# Patient Record
Sex: MALE | Race: WHITE | URBAN - METROPOLITAN AREA
[De-identification: names, ages, dates, MRNs, and addresses within clinical notes are randomized per-mention and may not be internally consistent; named-entity substitution may affect disease eponyms.]

---

## 2023-12-16 ENCOUNTER — DOCUMENTATION (OUTPATIENT)
Dept: BARIATRICS/WEIGHT MGMT | Facility: CLINIC | Age: 48
End: 2023-12-16

## 2023-12-16 PROBLEM — I10 ESSENTIAL HYPERTENSION: Status: ACTIVE | Noted: 2023-12-16

## 2023-12-16 RX ORDER — LOSARTAN POTASSIUM 50 MG/1
50 TABLET ORAL DAILY
COMMUNITY

## 2023-12-16 RX ORDER — AZITHROMYCIN 250 MG/1
TABLET, FILM COATED ORAL
Qty: 6 TABLET | Refills: 0 | Status: CANCELLED | OUTPATIENT
Start: 2023-12-16

## 2023-12-16 RX ORDER — AZITHROMYCIN 250 MG/1
TABLET, FILM COATED ORAL
Qty: 6 TABLET | Refills: 0 | Status: SHIPPED | OUTPATIENT
Start: 2023-12-16

## 2024-06-12 ENCOUNTER — DOCUMENTATION (OUTPATIENT)
Dept: BARIATRICS/WEIGHT MGMT | Facility: CLINIC | Age: 49
End: 2024-06-12

## 2024-06-12 RX ORDER — VALACYCLOVIR HYDROCHLORIDE 1 G/1
2000 TABLET, FILM COATED ORAL 2 TIMES DAILY PRN
Qty: 30 TABLET | Refills: 0 | Status: SHIPPED | OUTPATIENT
Start: 2024-06-12

## 2024-06-12 RX ORDER — ACYCLOVIR 50 MG/G
1 OINTMENT TOPICAL EVERY 4 HOURS PRN
Qty: 30 G | Refills: 1 | Status: SHIPPED | OUTPATIENT
Start: 2024-06-12

## 2025-03-06 ENCOUNTER — OFFICE VISIT (OUTPATIENT)
Dept: BARIATRICS/WEIGHT MGMT | Facility: CLINIC | Age: 50
End: 2025-03-06
Payer: COMMERCIAL

## 2025-03-06 VITALS
WEIGHT: 191 LBS | BODY MASS INDEX: 30.7 KG/M2 | TEMPERATURE: 97.8 F | DIASTOLIC BLOOD PRESSURE: 90 MMHG | HEART RATE: 80 BPM | HEIGHT: 66 IN | SYSTOLIC BLOOD PRESSURE: 138 MMHG

## 2025-03-06 DIAGNOSIS — I10 ESSENTIAL HYPERTENSION: ICD-10-CM

## 2025-03-06 DIAGNOSIS — E66.811 OBESITY, CLASS I, BMI 30-34.9: ICD-10-CM

## 2025-03-06 DIAGNOSIS — K21.9 GASTROESOPHAGEAL REFLUX DISEASE, UNSPECIFIED WHETHER ESOPHAGITIS PRESENT: Primary | ICD-10-CM

## 2025-03-06 DIAGNOSIS — R05.3 CHRONIC COUGH: ICD-10-CM

## 2025-03-06 PROCEDURE — 99202 OFFICE O/P NEW SF 15 MIN: CPT | Performed by: SURGERY

## 2025-03-06 RX ORDER — FAMOTIDINE 20 MG/1
20 TABLET, FILM COATED ORAL 2 TIMES DAILY
COMMUNITY

## 2025-03-06 RX ORDER — PANTOPRAZOLE SODIUM 40 MG/1
40 TABLET, DELAYED RELEASE ORAL DAILY
COMMUNITY

## 2025-03-06 RX ORDER — AMLODIPINE BESYLATE 5 MG/1
5 TABLET ORAL DAILY
COMMUNITY

## 2025-03-06 RX ORDER — LORATADINE 10 MG/1
10 TABLET ORAL DAILY
COMMUNITY

## 2025-03-06 NOTE — H&P (VIEW-ONLY)
Chief complaint: Chronic cough with history of hiatal hernia      Subjective     History of Present Illness:    Sam Roberson is a 49 y.o. male.  With complaints of chronic cough for the past several years.  Patient states that he underwent upper endoscopy about 15 years ago and was noted to have a hiatal hernia.  Patient was started on Protonix and was on that for many years which controlled his symptoms fairly well.  He states that his symptoms would improve with weight loss and worsen with weight gain.  He states that he was concerned about taking these medications and were off of this for a period of time that restarted the Protonix about 6 months ago which did help for a bit but now symptoms are back.  Patient also taking Pepcid 20 mg twice daily and Protonix 40 mg daily.  He has not had endoscopy since that 115 years ago.  Patient states he really does not have true heartburn symptoms but when he may have had that 1 or 2 times in the past but main is a chronic cough.  He sees an allergist and gets injections done monthly and is also see ENT in the past which he states everything was unremarkable.  He states that if he  bends over that can feel something up in his throat or esophagus and will cause him to cough.  Patient states that he does snore at times specially with weight gain but has not been diagnosed with sleep apnea.  He denies fever chills chest pain shortness of air bowel or urinary problems.  Nothing will improve his symptoms except listed above with PPI help.  Patient states that he does have congestion but no noticeable drainage into his throat.  He states that he does not need to keep the head of the bed elevated and no worsening in the evening when lying flat.  No worsening night cough that patient notices.      The following portions of the patient's history were reviewed and updated as appropriate: allergies, current medications, past family history, past medical history, past social  history, past surgical history, and problem list.    Review of Systems   HENT:  Positive for congestion.    Respiratory:  Positive for cough.    All other systems reviewed and are negative.      Objective   Physical Exam  Vitals reviewed.   HENT:      Head: Normocephalic and atraumatic.      Mouth/Throat:      Mouth: Mucous membranes are moist.      Pharynx: Oropharynx is clear.   Eyes:      General: No scleral icterus.     Extraocular Movements: Extraocular movements intact.      Conjunctiva/sclera: Conjunctivae normal.      Pupils: Pupils are equal, round, and reactive to light.   Neck:      Thyroid: No thyromegaly.   Cardiovascular:      Rate and Rhythm: Normal rate.   Pulmonary:      Effort: Pulmonary effort is normal. No respiratory distress.      Breath sounds: Normal breath sounds. No stridor. No wheezing or rhonchi.   Abdominal:      General: Bowel sounds are normal.      Palpations: Abdomen is soft.      Tenderness: There is no abdominal tenderness. There is no right CVA tenderness, left CVA tenderness, guarding or rebound.      Hernia: A hernia is present.      Comments: Small palpable mass at the umbilicus that reduces   Musculoskeletal:         General: Normal range of motion.      Cervical back: Normal range of motion and neck supple.   Lymphadenopathy:      Cervical: No cervical adenopathy.   Skin:     General: Skin is warm and dry.      Findings: No erythema.   Neurological:      Mental Status: He is alert and oriented to person, place, and time.   Psychiatric:         Mood and Affect: Mood normal.         Behavior: Behavior normal.         Thought Content: Thought content normal.         Judgment: Judgment normal.         Assessment & Plan   Diagnoses and all orders for this visit:    1. Gastroesophageal reflux disease, unspecified whether esophagitis present (Primary)  -     Case Request; Standing  -     Follow Anesthesia Guidelines / Protocol; Future  -     Case Request    2. Obesity, Class I, BMI  30-34.9  -     Case Request; Standing  -     Follow Anesthesia Guidelines / Protocol; Future  -     Case Request    3. Chronic cough  -     Case Request; Standing  -     Follow Anesthesia Guidelines / Protocol; Future  -     Case Request    4. Essential hypertension  -     Case Request; Standing  -     Follow Anesthesia Guidelines / Protocol; Future  -     Case Request    We will proceed with upper endoscopy to evaluate his hiatal hernia and rule out esophagitis/Almazan's.  I instructed him to continue with his current antacid regimen for now.  We will discuss further after endoscopy for further plan.  If it is a significant hiatal hernia with reflux then we could proceed with toupet hernia repair.  We also discussed trying to lose weight again which she has done in the past.  We discussed clean eating concentrating on lean protein and vegetables and meals only.  He does drink some bourbon at night but usually without calorie drinks.  If no significant hiatal hernia seen and/or no esophagitis then would recommend to see ENT again for further evaluation.  All questions were answered.  The risks and benefits of the procedure were discussed with the patient in detail and all questions were answered.

## 2025-03-10 ENCOUNTER — TELEPHONE (OUTPATIENT)
Dept: BARIATRICS/WEIGHT MGMT | Facility: CLINIC | Age: 50
End: 2025-03-10
Payer: COMMERCIAL

## 2025-03-18 ENCOUNTER — ANESTHESIA (OUTPATIENT)
Dept: GASTROENTEROLOGY | Facility: HOSPITAL | Age: 50
End: 2025-03-18
Payer: COMMERCIAL

## 2025-03-18 ENCOUNTER — ANESTHESIA EVENT (OUTPATIENT)
Dept: GASTROENTEROLOGY | Facility: HOSPITAL | Age: 50
End: 2025-03-18
Payer: COMMERCIAL

## 2025-03-18 ENCOUNTER — HOSPITAL ENCOUNTER (OUTPATIENT)
Facility: HOSPITAL | Age: 50
Setting detail: HOSPITAL OUTPATIENT SURGERY
Discharge: HOME | End: 2025-03-18
Attending: SURGERY | Admitting: SURGERY
Payer: COMMERCIAL

## 2025-03-18 VITALS
DIASTOLIC BLOOD PRESSURE: 93 MMHG | SYSTOLIC BLOOD PRESSURE: 132 MMHG | RESPIRATION RATE: 14 BRPM | HEIGHT: 66 IN | OXYGEN SATURATION: 98 % | WEIGHT: 188 LBS | BODY MASS INDEX: 30.22 KG/M2 | HEART RATE: 86 BPM

## 2025-03-18 DIAGNOSIS — R05.3 CHRONIC COUGH: ICD-10-CM

## 2025-03-18 DIAGNOSIS — E66.811 OBESITY, CLASS I, BMI 30-34.9: ICD-10-CM

## 2025-03-18 DIAGNOSIS — K21.9 GASTROESOPHAGEAL REFLUX DISEASE, UNSPECIFIED WHETHER ESOPHAGITIS PRESENT: ICD-10-CM

## 2025-03-18 DIAGNOSIS — I10 ESSENTIAL HYPERTENSION: ICD-10-CM

## 2025-03-18 DIAGNOSIS — K21.9 GASTROESOPHAGEAL REFLUX DISEASE, UNSPECIFIED WHETHER ESOPHAGITIS PRESENT: Primary | ICD-10-CM

## 2025-03-18 PROCEDURE — 25810000003 LACTATED RINGERS PER 1000 ML: Performed by: SURGERY

## 2025-03-18 PROCEDURE — 25010000002 LIDOCAINE 2% SOLUTION: Performed by: ANESTHESIOLOGY

## 2025-03-18 PROCEDURE — 88305 TISSUE EXAM BY PATHOLOGIST: CPT | Performed by: SURGERY

## 2025-03-18 PROCEDURE — 88342 IMHCHEM/IMCYTCHM 1ST ANTB: CPT | Performed by: SURGERY

## 2025-03-18 PROCEDURE — 43239 EGD BIOPSY SINGLE/MULTIPLE: CPT | Performed by: SURGERY

## 2025-03-18 PROCEDURE — 25010000002 PROPOFOL 10 MG/ML EMULSION: Performed by: ANESTHESIOLOGY

## 2025-03-18 RX ORDER — SODIUM CHLORIDE 0.9 % (FLUSH) 0.9 %
10 SYRINGE (ML) INJECTION AS NEEDED
Status: DISCONTINUED | OUTPATIENT
Start: 2025-03-18 | End: 2025-03-18 | Stop reason: HOSPADM

## 2025-03-18 RX ORDER — PROPOFOL 10 MG/ML
VIAL (ML) INTRAVENOUS AS NEEDED
Status: DISCONTINUED | OUTPATIENT
Start: 2025-03-18 | End: 2025-03-18 | Stop reason: SURG

## 2025-03-18 RX ORDER — LIDOCAINE HYDROCHLORIDE 20 MG/ML
INJECTION, SOLUTION INFILTRATION; PERINEURAL AS NEEDED
Status: DISCONTINUED | OUTPATIENT
Start: 2025-03-18 | End: 2025-03-18 | Stop reason: SURG

## 2025-03-18 RX ORDER — SODIUM CHLORIDE, SODIUM LACTATE, POTASSIUM CHLORIDE, CALCIUM CHLORIDE 600; 310; 30; 20 MG/100ML; MG/100ML; MG/100ML; MG/100ML
1000 INJECTION, SOLUTION INTRAVENOUS CONTINUOUS
Status: DISCONTINUED | OUTPATIENT
Start: 2025-03-18 | End: 2025-03-18 | Stop reason: HOSPADM

## 2025-03-18 RX ADMIN — PROPOFOL 200 MCG/KG/MIN: 10 INJECTION, EMULSION INTRAVENOUS at 13:37

## 2025-03-18 RX ADMIN — SODIUM CHLORIDE, SODIUM LACTATE, POTASSIUM CHLORIDE, CALCIUM CHLORIDE 1000 ML: 20; 30; 600; 310 INJECTION, SOLUTION INTRAVENOUS at 13:03

## 2025-03-18 RX ADMIN — LIDOCAINE HYDROCHLORIDE 60 MG: 20 INJECTION, SOLUTION INFILTRATION; PERINEURAL at 13:37

## 2025-03-18 RX ADMIN — PROPOFOL 200 MG: 10 INJECTION, EMULSION INTRAVENOUS at 13:37

## 2025-03-18 NOTE — ANESTHESIA PREPROCEDURE EVALUATION
Anesthesia Evaluation     Patient summary reviewed and Nursing notes reviewed   NPO Solid Status: > 8 hours  NPO Liquid Status: > 2 hours           Airway   Mallampati: II  TM distance: >3 FB  Neck ROM: full  No difficulty expected  Dental - normal exam     Pulmonary - normal exam    breath sounds clear to auscultation  Cardiovascular - normal exam    Rhythm: regular  Rate: normal    (+) hypertension less than 2 medications      Neuro/Psych  GI/Hepatic/Renal/Endo    (+) obesity, GERD    Musculoskeletal     Abdominal   (+) obese   Substance History      OB/GYN          Other                      Anesthesia Plan    ASA 2     MAC     intravenous induction         CODE STATUS:

## 2025-03-18 NOTE — ANESTHESIA POSTPROCEDURE EVALUATION
Patient: Sam Roberson    Procedure Summary       Date: 03/18/25 Room / Location:  [facility]    Anesthesia Start: 1334 Anesthesia Stop: 1348    Procedure: ESOPHAGOGASTRODUODENOSCOPY WITH BIOPSY (Esophagus) Diagnosis:       Gastroesophageal reflux disease, unspecified whether esophagitis present      Obesity, Class I, BMI 30-34.9      Chronic cough      Essential hypertension      (Gastroesophageal reflux disease, unspecified whether esophagitis present [K21.9])      (Obesity, Class I, BMI 30-34.9 [E66.811])      (Chronic cough [R05.3])      (Essential hypertension [I10])    Surgeons: Marco Mosley Jr., MD Provider: Thien Lopez MD    Anesthesia Type: MAC ASA Status: 2            Anesthesia Type: MAC    Vitals  Vitals Value Taken Time   /100 03/18/25 14:08   Temp     Pulse 75 03/18/25 14:13   Resp 14 03/18/25 13:57   SpO2 95 % 03/18/25 14:13   Vitals shown include unfiled device data.        Post Anesthesia Care and Evaluation    Patient location during evaluation: PHASE II  Patient participation: complete - patient participated  Level of consciousness: awake and alert  Pain management: adequate    Airway patency: patent  Anesthetic complications: No anesthetic complications  PONV Status: none  Cardiovascular status: acceptable and hemodynamically stable  Respiratory status: acceptable, nonlabored ventilation and spontaneous ventilation  Hydration status: acceptable

## 2025-03-18 NOTE — OP NOTE
Surgeon: Marco Mosley Jr., M.D.    Preoperative Diagnosis: #1 chronic cough #2 history of hiatal hernia    Postoperative Diagnosis: #1 gastritis #2 questionable small hiatal hernia    Procedure Performed: Transoral esophagogastroduodenoscopy with gastric antral biopsies    Indications: 49-year-old gentleman with history of hiatal hernia with a chronic cough.  Patient is on Protonix but no heartburn symptoms.    Procedure:     The procedure, indications, preparation and potential complications were explained to the patient, who indicated understanding and signed the corresponding consent forms.  The patient was identified, taken to the endoscopy suite, and placed on the left side down decubitus position.  The patient underwent a MAC anesthesia and was appropriately monitored through the case by the anesthesia personnel with continuous pulse oximetry, blood pressure, and cardiac monitoring.  A bite block was placed.  After adequate IV sedation and using a transoral technique a lubed flexible endoscope was placed in the hypopharynx and advanced to the second portion of the duodenum without difficulty. The scope was then withdrawn back into the antrum of the stomach.  Cold forcep biopsies of the antrum were taken to rule out Helicobacter pylori.  The scope was retroflexed noting the body, fundus and cardia.  The scope was then withdrawn back into the esophagus after decompressing the stomach.  The Z line was noted and GE junction measured from the incisors.  The scope was then completely withdrawn.  The patient tolerated the procedure well and left the endoscopy suite in stable condition.  The findings are listed below.    Duodenum: Unremarkable  Antrum: Patchy erythema  Body/Fundus: Patchy erythema  Cardia: Questionable small defect  Esophagus: Z-line regular, no erosive esophagitis; GE junction measured approximately 40 cm from the incisors    Recommendations:     Await biopsy results and follow-up in the office.   Most likely proceed with upper GI for further evaluation.

## 2025-03-18 NOTE — DISCHARGE INSTRUCTIONS
For the next 24 hours patient needs to be with a responsible adult.    For 24 hours DO NOT drive, operate machinery, appliances, drink alcohol, make important decisions or sign legal documents.    Start with a light or bland diet if you are feeling sick to your stomach otherwise advance to regular diet as tolerated.    Follow recommendations on procedure report if provided by your doctor.    Call Dr morgan for problems [phone]    Problems may include but not limited to: large amounts of bleeding, trouble breathing, repeated vomiting, severe unrelieved pain, fever or chills.

## 2025-03-20 LAB
CYTO UR: NORMAL
LAB AP CASE REPORT: NORMAL
PATH REPORT.ADDENDUM SPEC: NORMAL
PATH REPORT.FINAL DX SPEC: NORMAL
PATH REPORT.GROSS SPEC: NORMAL

## 2025-04-09 ENCOUNTER — HOSPITAL ENCOUNTER (OUTPATIENT)
Dept: GENERAL RADIOLOGY | Facility: HOSPITAL | Age: 50
Discharge: HOME | End: 2025-04-09
Admitting: SURGERY
Payer: COMMERCIAL

## 2025-04-09 DIAGNOSIS — K21.9 GASTROESOPHAGEAL REFLUX DISEASE, UNSPECIFIED WHETHER ESOPHAGITIS PRESENT: ICD-10-CM

## 2025-04-09 PROCEDURE — 74246 X-RAY XM UPR GI TRC 2CNTRST: CPT

## 2025-04-09 RX ADMIN — BARIUM SULFATE 135 ML: 980 POWDER, FOR SUSPENSION ORAL at 09:46

## 2025-04-09 RX ADMIN — BARIUM SULFATE 700 MG: 700 TABLET ORAL at 09:45

## 2025-04-09 RX ADMIN — BARIUM SULFATE 183 ML: 960 POWDER, FOR SUSPENSION ORAL at 09:45

## 2025-04-09 RX ADMIN — ANTACID/ANTIFLATULENT 1 PACKET: 380; 550; 10; 10 GRANULE, EFFERVESCENT ORAL at 09:46

## 2025-04-23 ENCOUNTER — DOCUMENTATION (OUTPATIENT)
Dept: BARIATRICS/WEIGHT MGMT | Facility: CLINIC | Age: 50
End: 2025-04-23
Payer: COMMERCIAL

## 2025-04-23 RX ORDER — VALACYCLOVIR HYDROCHLORIDE 1 G/1
2000 TABLET, FILM COATED ORAL 2 TIMES DAILY PRN
Qty: 60 TABLET | Refills: 1 | Status: SHIPPED | OUTPATIENT
Start: 2025-04-23

## 2025-07-02 ENCOUNTER — DOCUMENTATION (OUTPATIENT)
Dept: BARIATRICS/WEIGHT MGMT | Facility: CLINIC | Age: 50
End: 2025-07-02
Payer: COMMERCIAL

## 2025-07-02 RX ORDER — MOMETASONE FUROATE MONOHYDRATE 50 UG/1
2 SPRAY, METERED NASAL DAILY
Qty: 5 EACH | Refills: 12 | Status: SHIPPED | OUTPATIENT
Start: 2025-07-02

## (undated) DEVICE — FRCP BX RADJAW4 NDL 2.8 240CM LG OG BX40

## (undated) DEVICE — KT ORCA ORCAPOD DISP STRL

## (undated) DEVICE — TUBING, SUCTION, 1/4" X 10', STRAIGHT: Brand: MEDLINE

## (undated) DEVICE — BLCK/BITE BLOX WO/DENTL/RIM W/STRAP 54F

## (undated) DEVICE — ADAPT CLN BIOGUARD AIR/H2O DISP

## (undated) DEVICE — MSK PROC CURAPLEX O2 2/ADAPT 7FT

## (undated) DEVICE — LN SMPL CO2 SHTRM SD STREAM W/M LUER

## (undated) DEVICE — SENSR O2 OXIMAX FNGR A/ 18IN NONSTR

## (undated) DEVICE — BLCK/BITE BLOX W/DENTL/RIM W/STRAP 54F